# Patient Record
Sex: FEMALE | Race: OTHER | URBAN - METROPOLITAN AREA
[De-identification: names, ages, dates, MRNs, and addresses within clinical notes are randomized per-mention and may not be internally consistent; named-entity substitution may affect disease eponyms.]

---

## 2021-07-27 ENCOUNTER — HOSPITAL ENCOUNTER (EMERGENCY)
Age: 41
Discharge: HOME | End: 2021-07-27
Payer: MEDICARE

## 2021-07-27 VITALS
OXYGEN SATURATION: 98 % | TEMPERATURE: 97.2 F | HEART RATE: 65 BPM | DIASTOLIC BLOOD PRESSURE: 127 MMHG | BODY MASS INDEX: 38.32 KG/M2 | RESPIRATION RATE: 16 BRPM | HEIGHT: 65 IN | SYSTOLIC BLOOD PRESSURE: 216 MMHG | WEIGHT: 230 LBS

## 2021-07-27 LAB
ANION GAP SERPL CALCULATED.3IONS-SCNC: 10 MMOL/L (ref 7–16)
BASOPHILS ABSOLUTE: 0.08 E9/L (ref 0–0.2)
BASOPHILS RELATIVE PERCENT: 1 % (ref 0–2)
BUN BLDV-MCNC: 16 MG/DL (ref 6–20)
CALCIUM SERPL-MCNC: 9 MG/DL (ref 8.6–10.2)
CHLORIDE BLD-SCNC: 104 MMOL/L (ref 98–107)
CO2: 25 MMOL/L (ref 22–29)
CREAT SERPL-MCNC: 0.8 MG/DL (ref 0.5–1)
EKG ATRIAL RATE: 66 BPM
EKG P AXIS: 21 DEGREES
EKG P-R INTERVAL: 142 MS
EKG Q-T INTERVAL: 420 MS
EKG QRS DURATION: 86 MS
EKG QTC CALCULATION (BAZETT): 440 MS
EKG R AXIS: 55 DEGREES
EKG T AXIS: 48 DEGREES
EKG VENTRICULAR RATE: 66 BPM
EOSINOPHILS ABSOLUTE: 0.17 E9/L (ref 0.05–0.5)
EOSINOPHILS RELATIVE PERCENT: 2.2 % (ref 0–6)
GFR AFRICAN AMERICAN: >60
GFR NON-AFRICAN AMERICAN: >60 ML/MIN/1.73
GLUCOSE BLD-MCNC: 106 MG/DL (ref 74–99)
HCT VFR BLD CALC: 39.3 % (ref 34–48)
HEMOGLOBIN: 12.9 G/DL (ref 11.5–15.5)
IMMATURE GRANULOCYTES #: 0.02 E9/L
IMMATURE GRANULOCYTES %: 0.3 % (ref 0–5)
LYMPHOCYTES ABSOLUTE: 2.29 E9/L (ref 1.5–4)
LYMPHOCYTES RELATIVE PERCENT: 29.7 % (ref 20–42)
MCH RBC QN AUTO: 28.1 PG (ref 26–35)
MCHC RBC AUTO-ENTMCNC: 32.8 % (ref 32–34.5)
MCV RBC AUTO: 85.6 FL (ref 80–99.9)
MONOCYTES ABSOLUTE: 0.52 E9/L (ref 0.1–0.95)
MONOCYTES RELATIVE PERCENT: 6.8 % (ref 2–12)
NEUTROPHILS ABSOLUTE: 4.62 E9/L (ref 1.8–7.3)
NEUTROPHILS RELATIVE PERCENT: 60 % (ref 43–80)
PDW BLD-RTO: 12.8 FL (ref 11.5–15)
PLATELET # BLD: 400 E9/L (ref 130–450)
PMV BLD AUTO: 9.5 FL (ref 7–12)
POTASSIUM SERPL-SCNC: 3.8 MMOL/L (ref 3.5–5)
PRO-BNP: 47 PG/ML (ref 0–125)
RBC # BLD: 4.59 E12/L (ref 3.5–5.5)
SODIUM BLD-SCNC: 139 MMOL/L (ref 132–146)
TROPONIN, HIGH SENSITIVITY: <6 NG/L (ref 0–9)
WBC # BLD: 7.7 E9/L (ref 4.5–11.5)

## 2021-07-27 PROCEDURE — 93010 ELECTROCARDIOGRAM REPORT: CPT | Performed by: INTERNAL MEDICINE

## 2021-07-27 PROCEDURE — 85025 COMPLETE CBC W/AUTO DIFF WBC: CPT

## 2021-07-27 PROCEDURE — 93005 ELECTROCARDIOGRAM TRACING: CPT | Performed by: EMERGENCY MEDICINE

## 2021-07-27 PROCEDURE — 84484 ASSAY OF TROPONIN QUANT: CPT

## 2021-07-27 PROCEDURE — 4500000002 HC ER NO CHARGE

## 2021-07-27 PROCEDURE — 80048 BASIC METABOLIC PNL TOTAL CA: CPT

## 2021-07-27 PROCEDURE — 83880 ASSAY OF NATRIURETIC PEPTIDE: CPT

## 2021-07-27 ASSESSMENT — PAIN DESCRIPTION - LOCATION: LOCATION: HEAD

## 2021-07-27 ASSESSMENT — PAIN DESCRIPTION - ORIENTATION: ORIENTATION: RIGHT

## 2021-07-27 ASSESSMENT — PAIN DESCRIPTION - DESCRIPTORS: DESCRIPTORS: PRESSURE;HEADACHE

## 2021-07-27 ASSESSMENT — PAIN DESCRIPTION - PAIN TYPE: TYPE: ACUTE PAIN

## 2021-07-27 NOTE — ED NOTES
Bloodwork completed and sent to lab.   EKG completed and given to Dr Reina Moseley, RN  07/27/21 [id number]

## 2022-01-06 ENCOUNTER — OFFICE VISIT (OUTPATIENT)
Dept: FAMILY MEDICINE CLINIC | Age: 42
End: 2022-01-06
Payer: MEDICARE

## 2022-01-06 VITALS
BODY MASS INDEX: 36.49 KG/M2 | RESPIRATION RATE: 18 BRPM | TEMPERATURE: 98 F | SYSTOLIC BLOOD PRESSURE: 166 MMHG | WEIGHT: 219 LBS | OXYGEN SATURATION: 98 % | DIASTOLIC BLOOD PRESSURE: 104 MMHG | HEART RATE: 76 BPM | HEIGHT: 65 IN

## 2022-01-06 DIAGNOSIS — I10 PRIMARY HYPERTENSION: ICD-10-CM

## 2022-01-06 DIAGNOSIS — Z00.00 HEALTHCARE MAINTENANCE: ICD-10-CM

## 2022-01-06 DIAGNOSIS — J45.909 UNCOMPLICATED ASTHMA, UNSPECIFIED ASTHMA SEVERITY, UNSPECIFIED WHETHER PERSISTENT: ICD-10-CM

## 2022-01-06 DIAGNOSIS — Z12.39 SCREENING BREAST EXAMINATION: Primary | ICD-10-CM

## 2022-01-06 LAB
BACTERIA: ABNORMAL /HPF
BILIRUBIN URINE: NEGATIVE
BLOOD, URINE: ABNORMAL
CLARITY: CLEAR
COLOR: YELLOW
CRYSTALS, UA: ABNORMAL /HPF
EPITHELIAL CELLS, UA: ABNORMAL /HPF
GLUCOSE URINE: NEGATIVE MG/DL
HCT VFR BLD CALC: 41.6 % (ref 34–48)
HEMOGLOBIN: 13.7 G/DL (ref 11.5–15.5)
KETONES, URINE: NEGATIVE MG/DL
LEUKOCYTE ESTERASE, URINE: ABNORMAL
MCH RBC QN AUTO: 28.3 PG (ref 26–35)
MCHC RBC AUTO-ENTMCNC: 32.9 % (ref 32–34.5)
MCV RBC AUTO: 86 FL (ref 80–99.9)
NITRITE, URINE: NEGATIVE
PDW BLD-RTO: 13.9 FL (ref 11.5–15)
PH UA: 5.5 (ref 5–9)
PLATELET # BLD: 306 E9/L (ref 130–450)
PMV BLD AUTO: 10.2 FL (ref 7–12)
PROTEIN UA: NEGATIVE MG/DL
RBC # BLD: 4.84 E12/L (ref 3.5–5.5)
RBC UA: ABNORMAL /HPF (ref 0–2)
SPECIFIC GRAVITY UA: 1.02 (ref 1–1.03)
UROBILINOGEN, URINE: 0.2 E.U./DL
WBC # BLD: 4.6 E9/L (ref 4.5–11.5)
WBC UA: ABNORMAL /HPF (ref 0–5)

## 2022-01-06 PROCEDURE — 36415 COLL VENOUS BLD VENIPUNCTURE: CPT | Performed by: FAMILY MEDICINE

## 2022-01-06 PROCEDURE — G8417 CALC BMI ABV UP PARAM F/U: HCPCS

## 2022-01-06 PROCEDURE — 4004F PT TOBACCO SCREEN RCVD TLK: CPT

## 2022-01-06 PROCEDURE — 99203 OFFICE O/P NEW LOW 30 MIN: CPT

## 2022-01-06 PROCEDURE — G8484 FLU IMMUNIZE NO ADMIN: HCPCS

## 2022-01-06 PROCEDURE — G8427 DOCREV CUR MEDS BY ELIG CLIN: HCPCS

## 2022-01-06 RX ORDER — ALBUTEROL SULFATE 90 UG/1
2 AEROSOL, METERED RESPIRATORY (INHALATION) 4 TIMES DAILY PRN
Qty: 18 G | Refills: 0 | Status: SHIPPED
Start: 2022-01-06 | End: 2022-10-31 | Stop reason: SDUPTHER

## 2022-01-06 RX ORDER — AMLODIPINE BESYLATE 5 MG/1
5 TABLET ORAL DAILY
Qty: 30 TABLET | Refills: 3 | Status: SHIPPED
Start: 2022-01-06 | End: 2022-02-21 | Stop reason: SINTOL

## 2022-01-06 SDOH — ECONOMIC STABILITY: FOOD INSECURITY: WITHIN THE PAST 12 MONTHS, YOU WORRIED THAT YOUR FOOD WOULD RUN OUT BEFORE YOU GOT MONEY TO BUY MORE.: NEVER TRUE

## 2022-01-06 SDOH — ECONOMIC STABILITY: FOOD INSECURITY: WITHIN THE PAST 12 MONTHS, THE FOOD YOU BOUGHT JUST DIDN'T LAST AND YOU DIDN'T HAVE MONEY TO GET MORE.: NEVER TRUE

## 2022-01-06 ASSESSMENT — LIFESTYLE VARIABLES
HOW MANY STANDARD DRINKS CONTAINING ALCOHOL DO YOU HAVE ON A TYPICAL DAY: 3 OR 4
HOW OFTEN DO YOU HAVE A DRINK CONTAINING ALCOHOL: 4 OR MORE TIMES A WEEK

## 2022-01-06 ASSESSMENT — SOCIAL DETERMINANTS OF HEALTH (SDOH): HOW HARD IS IT FOR YOU TO PAY FOR THE VERY BASICS LIKE FOOD, HOUSING, MEDICAL CARE, AND HEATING?: NOT HARD AT ALL

## 2022-01-06 NOTE — PROGRESS NOTES
S: Scott Davis 42 y.o. female  here for establishment of care with new provider. Chief concerns today include hypertension and asthma; she needs medication refills. Hypertension n: Patient is noted to be hypertensive today. She has been out of medications and needs refills. She just moved to [facility] from [place], so there are no old records to review. Patient is unsure of what medication she was taking prior to running out. Reports headaches without the presence of any other hypertensive symptoms. She has been drinking alcohol over the last several days. Asthma: Status unknown. She had an albuterol inhaler that she used twice daily; does not know the name of any other additional medication she may be on. Symptoms include wheezing and shortness of breath. Social history: Tobacco use: 1 pack/day. Alcohol use: 1 bottle of wine per week. Denies drug use. No surgical history. FH: Unremarkable  Health maintenance: Patient inquired about breast cancer screening. Requests Covid testing in order to return to work. Denies symptoms and denies contacts. O: VS: BP (!) 166/104 (Site: Left Upper Arm, Position: Sitting, Cuff Size: Medium Adult)   Pulse 76   Temp 98 °F (36.7 °C) (Temporal)   Resp 18   Ht 5' 5\" (1.651 m)   Wt 219 lb (99.3 kg)   LMP 12/31/2021   SpO2 98%   BMI 36.44 kg/m²    General: NAD. She is obese. Neck: WDL   CV:  RRR, no gallops, rubs, or murmurs   Resp: CTAB; remarkable for end expiratory wheezes bilaterally. Abd:  Soft, nontender, no masses    Ext:  no C/C/E    Assessment / Plan:      Judy Manriquez was seen today for establish care, asthma and hypertension. Diagnoses and all orders for this visit:      Primary hypertension  Uncontrolled at the moment as she is not taking any medication. States she previously was on a pill but does not know name of medication. Will start trial of amlodipine 5mg once a day and obtain BMP to asses for renal function.  Also TSH,CBC, lipid, UA. Will follow up with her in 3 weeks for BP check  -Norvasc 5mg once a day  - URINALYSIS; Future  - LIPID PANEL; Future  - BASIC METABOLIC PANEL; Future  - CBC; Future  - TSH; Future  - T4, FREE; Future      Healthcare maintenance  Needs mammography and following labs. - COVID-19 Ambulatory; Future  - URINALYSIS; Future  - LIPID PANEL; Future  - BASIC METABOLIC PANEL; Future  - CBC; Future  - TSH; Future  - T4, FREE; Future  - JOSE SHAKILA DIGITAL SCREEN BILATERAL; Future        Uncomplicated asthma, unspecified asthma severity, unspecified whether persistent  History of asthma previosuly treated with albuterol. Unknown if she has been on any other medication in the past. Will prescribe rescue inhaler and instruct patient to keep a log on how many times she in using in a day. Will follow up on next visit to assess if patient needs to be started on ICS. Consider PFTs. - Abluterol inhaler.                 Return to Office: Return in about 3 weeks (around 1/27/2022). Assessment/Plan:  1. Encounter to establish with new provider  2. Hypertension: Uncontrolled because she has been out of medication; medication is unknown. Trial of amlodipine 5 mg a day initiated. Obtain BMP to get baseline renal functions. Other labs include UA, CBC, TSH, lipid panel. Lifestyle modifications discussed. 3.  Asthma: Uncontrolled because she is out of medication. Will prescribe albuterol rescue inhaler at this time. Patient is counseled to keep track of how often she is using this inhaler. Smoking cessation discussed. 4.  Health maintenance: Mammography ordered. Covid testing ordered. Return in about 3 weeks (around 1/27/2022). Follow-up up in 2 weeks to assess blood pressure with the start of medication and to assess response of asthma symptoms to use of rescue inhaler. If rescue inhaler is being used frequently, need to consider PFTs and starting an ICS.     Attending Physician Statement  I have discussed the case, including pertinent history and exam findings with the resident. I agree with the documented assessment and plan.          Blu Shoemaker MD

## 2022-01-06 NOTE — PROGRESS NOTES
img [id number]  [address] RESIDENCY PROGRAM  DATE OF VISIT : 1/15/2022    Patient : Grey Fernandez   Age : 42 y.o.  : 1980   MRN : <[id number]>   ______________________________________________________________________    Chief Complaint:   Chief Complaint   Patient presents with   [address]     no meds for 6 months    Asthma    Hypertension     requesting covid test       HPI:   History obtained from the patient. Grey Fernandez is a 42 y.o. female who comes to clinic to establish care. Past medical history of asthma and Hypertension. Recently moved to [facility] from [facility], needs medication refills and to establish new pcp. Asthma treated with albuterol inhaler, uses it at least once daily. Not on any other medication. States she presents occasionally shortness of breath or wheezing that is relieved with albuterol  Hypertension was being treated previously with one pill once a day but she does not know the name. Has not been able to take BP at home and does not follow any diet. States that she has had headache for last couple of days but has been drinking heavily due to festivities. No headaches apart from these last days. Denies any nausea, vomiting, palpitation or chest pain    Also wants mammography done, states she feels lump that comes and goes every couple of days, is painless and does not present any discharge. Does not correlates it to menstrual periods. Lump has been present for around a month on left breast.  LMP: 2022   Also wants Covid test done. States she is asymptomatic but needs it to go back to work in [employer]. Denies any recent sick contacts or exposure. Alcohol : 1 bottle wine per week  Smokes 1 pack per day    Sexual activity: 1 partner. Denies having sons or pregnancy      Past Medical History:  No past medical history on file. Past Surgical History:  No past surgical history on file.     Family History:  No family history on file.    Social History:  Social History     Socioeconomic History    Marital status: Legally      Spouse name: None    Number of children: None    Years of education: None    Highest education level: None   Occupational History    None   Tobacco Use    Smoking status: Current Every Day Smoker     Packs/day: 1.00     Types: Cigarettes     Start date: 1/1/1994    Smokeless tobacco: Never Used   Substance and Sexual Activity    Alcohol use: Yes     Comment: socially    Drug use: Yes     Types: Marijuana Lyndel Eusebia)    Sexual activity: None   Other Topics Concern    None   Social History Narrative    None     Social Determinants of Health     Financial Resource Strain: Low Risk     Difficulty of Paying Living Expenses: Not hard at all   Food Insecurity: No Food Insecurity    Worried About Running Out of Food in the Last Year: Never true    Dylon of Food in the Last Year: Never true   Transportation Needs:     Lack of Transportation (Medical): Not on file    Lack of Transportation (Non-Medical):  Not on file   Physical Activity:     Days of Exercise per Week: Not on file    Minutes of Exercise per Session: Not on file   Stress:     Feeling of Stress : Not on file   Social Connections:     Frequency of Communication with Friends and Family: Not on file    Frequency of Social Gatherings with Friends and Family: Not on file    Attends Zoroastrianism Services: Not on file    Active Member of [address] [place] [address] or Organizations: Not on file    Attends Club or Organization Meetings: Not on file    Marital Status: Not on file   Intimate Partner Violence:     Fear of Current or Ex-Partner: Not on file    Emotionally Abused: Not on file    Physically Abused: Not on file    Sexually Abused: Not on file   Housing Stability:     Unable to Pay for Housing in the Last Year: Not on file    Number of [place] in the Last Year: Not on file    Unstable Housing in the Last Year: Not on file       Allergies:   No Known Allergies    Medications:    Current Outpatient Medications   Medication Sig Dispense Refill    amLODIPine (NORVASC) 5 MG tablet Take 1 tablet by mouth daily 30 tablet 3    albuterol sulfate HFA (VENTOLIN HFA) 108 (90 Base) MCG/ACT inhaler Inhale 2 puffs into the lungs 4 times daily as needed for Wheezing 18 g 0     No current facility-administered medications for this visit. Review of Systems:  Review of Systems   Constitutional: Negative for chills, diaphoresis, fatigue and fever. HENT: Negative for nosebleeds, postnasal drip, rhinorrhea, sinus pain and tinnitus. Eyes: Negative for photophobia, pain and visual disturbance. Respiratory: Positive for shortness of breath and wheezing. Negative for cough, choking, chest tightness and stridor. Cardiovascular: Negative for chest pain, palpitations and leg swelling. Gastrointestinal: Negative for constipation, diarrhea, nausea and vomiting. Endocrine: Negative for polydipsia, polyphagia and polyuria. Genitourinary: Negative for dyspareunia, dysuria, enuresis and hematuria. Musculoskeletal: Negative for arthralgias, back pain, gait problem and myalgias. Skin: Negative for pallor, rash and wound. Allergic/Immunologic: Negative for immunocompromised state. Neurological: Positive for headaches. Negative for tremors, seizures, facial asymmetry, weakness and light-headedness. Hematological: Negative for adenopathy. Does not bruise/bleed easily.    Psychiatric/Behavioral: Negative for confusion, decreased concentration, dysphoric mood, hallucinations and suicidal ideas.     ______________________________________________________________________    Physical Exam:    Vitals: BP (!) 166/104 (Site: Left Upper Arm, Position: Sitting, Cuff Size: Medium Adult)   Pulse 76   Temp 98 °F (36.7 °C) (Temporal)   Resp 18   Ht 5' 5\" (1.651 m)   Wt 219 lb (99.3 kg)   LMP 12/31/2021   SpO2 98%   BMI 36.44 kg/m²   Physical Exam  Constitutional: General: She is not in acute distress. Appearance: Normal appearance. She is not toxic-appearing. HENT:      Head: Normocephalic and atraumatic. Right Ear: Ear canal normal.      Left Ear: Ear canal normal.      Nose: Nose normal. No congestion. Mouth/Throat:      Mouth: Mucous membranes are moist.      Pharynx: Oropharynx is clear. No oropharyngeal exudate. Eyes:      Pupils: Pupils are equal, round, and reactive to light. Cardiovascular:      Rate and Rhythm: Normal rate and regular rhythm. Pulses: Normal pulses. Heart sounds: Normal heart sounds. No murmur heard. No gallop. Pulmonary:      Effort: Pulmonary effort is normal.      Breath sounds: No stridor. Wheezing present. No rales. Abdominal:      General: Abdomen is flat. Bowel sounds are normal.      Palpations: There is no mass. Tenderness: There is no abdominal tenderness. Hernia: No hernia is present. Musculoskeletal:         General: No swelling or tenderness. Normal range of motion. Cervical back: Normal range of motion and neck supple. Right lower leg: No edema. Left lower leg: No edema. Skin:     General: Skin is warm and dry. Capillary Refill: Capillary refill takes less than 2 seconds. Findings: No bruising or lesion. Neurological:      General: No focal deficit present. Mental Status: She is alert and oriented to person, place, and time. Motor: No weakness. Gait: Gait normal.   Psychiatric:         Mood and Affect: Mood normal.         Behavior: Behavior normal.       ______________________________________________________________________    Assessment & Plan:    Primary hypertension  Uncontrolled at the moment as she is not taking any medication. States she previously was on a pill but does not know name of medication. Will start trial of amlodipine 5mg once a day and obtain BMP to asses for renal function. Also TSH,CBC, lipid, UA.  Will follow up with her in 3 weeks for BP check  -Norvasc 5mg once a day  - URINALYSIS; Future  - LIPID PANEL; Future  - BASIC METABOLIC PANEL; Future  - CBC; Future  - TSH; Future  - T4, FREE; Future     Healthcare maintenance  Needs mammography and following labs. - COVID-19 Ambulatory; Future  - URINALYSIS; Future  - LIPID PANEL; Future  - BASIC METABOLIC PANEL; Future  - CBC; Future  - TSH; Future  - T4, FREE; Future  - JOSE SHAKILA DIGITAL SCREEN BILATERAL; Future      Uncomplicated asthma, unspecified asthma severity, unspecified whether persistent  History of asthma previosuly treated with albuterol. Unknown if she has been on any other medication in the past. Will prescribe rescue inhaler and instruct patient to keep a log on how many times she in using in a day. Will follow up on next visit to assess if patient needs to be started on ICS. Consider PFTs. - Abluterol inhaler. Return to Office: Return in about 3 weeks (around 1/27/2022).     Devang Shipley MD   This case was discussed with Dr. Cadence Moore

## 2022-01-07 LAB
ANION GAP SERPL CALCULATED.3IONS-SCNC: 11 MMOL/L (ref 7–16)
BUN BLDV-MCNC: 14 MG/DL (ref 6–20)
CALCIUM SERPL-MCNC: 9.2 MG/DL (ref 8.6–10.2)
CHLORIDE BLD-SCNC: 103 MMOL/L (ref 98–107)
CHOLESTEROL, TOTAL: 194 MG/DL (ref 0–199)
CO2: 24 MMOL/L (ref 22–29)
CREAT SERPL-MCNC: 0.8 MG/DL (ref 0.5–1)
GFR AFRICAN AMERICAN: >60
GFR NON-AFRICAN AMERICAN: >60 ML/MIN/1.73
GLUCOSE BLD-MCNC: 90 MG/DL (ref 74–99)
HDLC SERPL-MCNC: 49 MG/DL
LDL CHOLESTEROL CALCULATED: 126 MG/DL (ref 0–99)
POTASSIUM SERPL-SCNC: 3.8 MMOL/L (ref 3.5–5)
SODIUM BLD-SCNC: 138 MMOL/L (ref 132–146)
T4 FREE: 1.48 NG/DL (ref 0.93–1.7)
TRIGL SERPL-MCNC: 95 MG/DL (ref 0–149)
TSH SERPL DL<=0.05 MIU/L-ACNC: 1.01 UIU/ML (ref 0.27–4.2)
VLDLC SERPL CALC-MCNC: 19 MG/DL

## 2022-01-08 LAB
SARS-COV-2: DETECTED
SOURCE: ABNORMAL

## 2022-01-14 ENCOUNTER — TELEPHONE (OUTPATIENT)
Dept: FAMILY MEDICINE CLINIC | Age: 42
End: 2022-01-14

## 2022-01-15 ENCOUNTER — TELEPHONE (OUTPATIENT)
Dept: FAMILY MEDICINE CLINIC | Age: 42
End: 2022-01-15

## 2022-01-15 RX ORDER — LISINOPRIL 10 MG/1
10 TABLET ORAL DAILY
Qty: 30 TABLET | Refills: 0 | Status: SHIPPED
Start: 2022-01-15 | End: 2022-02-09

## 2022-01-15 ASSESSMENT — ENCOUNTER SYMPTOMS
VOMITING: 0
CHOKING: 0
BACK PAIN: 0
SHORTNESS OF BREATH: 1
RHINORRHEA: 0
COUGH: 0
WHEEZING: 1
EYE PAIN: 0
DIARRHEA: 0
NAUSEA: 0
PHOTOPHOBIA: 0
SINUS PAIN: 0
CHEST TIGHTNESS: 0
STRIDOR: 0
CONSTIPATION: 0

## 2022-01-15 NOTE — TELEPHONE ENCOUNTER
Called patient to inform about lab and covid results. Patient told me that she was experiencing itchiness and rashes on arms and face after taking Norvasc 5mg as prescribed. Told patient to stop taking norvasc and start taking Lisinopril 10mgs once a day. Medication sent to pharmacy.      Maddie Singh MD

## 2022-01-21 ENCOUNTER — HOSPITAL ENCOUNTER (OUTPATIENT)
Dept: GENERAL RADIOLOGY | Age: 42
Discharge: HOME | End: 2022-01-23
Payer: MEDICARE

## 2022-01-21 DIAGNOSIS — Z00.00 HEALTHCARE MAINTENANCE: ICD-10-CM

## 2022-01-21 DIAGNOSIS — Z12.39 SCREENING BREAST EXAMINATION: ICD-10-CM

## 2022-01-21 PROCEDURE — 77063 BREAST TOMOSYNTHESIS BI: CPT

## 2022-01-26 DIAGNOSIS — R92.8 ABNORMAL SCREENING MAMMOGRAM: Primary | ICD-10-CM

## 2022-02-01 ENCOUNTER — TELEPHONE (OUTPATIENT)
Dept: GENERAL RADIOLOGY | Age: 42
End: 2022-02-01

## 2022-02-02 ENCOUNTER — HOSPITAL ENCOUNTER (OUTPATIENT)
Dept: GENERAL RADIOLOGY | Age: 42
Discharge: HOME | End: 2022-02-04
Payer: MEDICARE

## 2022-02-02 DIAGNOSIS — R92.8 ABNORMAL SCREENING MAMMOGRAM: ICD-10-CM

## 2022-02-02 PROCEDURE — 76642 ULTRASOUND BREAST LIMITED: CPT

## 2022-02-02 PROCEDURE — G0279 TOMOSYNTHESIS, MAMMO: HCPCS

## 2022-02-09 RX ORDER — LISINOPRIL 10 MG/1
TABLET ORAL
Qty: 30 TABLET | Refills: 0 | Status: SHIPPED
Start: 2022-02-09 | End: 2022-02-21 | Stop reason: SDUPTHER

## 2022-02-21 ENCOUNTER — OFFICE VISIT (OUTPATIENT)
Dept: FAMILY MEDICINE CLINIC | Age: 42
End: 2022-02-21
Payer: MEDICARE

## 2022-02-21 VITALS
DIASTOLIC BLOOD PRESSURE: 99 MMHG | WEIGHT: 216.8 LBS | BODY MASS INDEX: 36.12 KG/M2 | HEART RATE: 69 BPM | RESPIRATION RATE: 18 BRPM | SYSTOLIC BLOOD PRESSURE: 153 MMHG | HEIGHT: 65 IN | TEMPERATURE: 99.9 F | OXYGEN SATURATION: 98 %

## 2022-02-21 DIAGNOSIS — Z00.00 HEALTHCARE MAINTENANCE: ICD-10-CM

## 2022-02-21 DIAGNOSIS — I10 PRIMARY HYPERTENSION: ICD-10-CM

## 2022-02-21 DIAGNOSIS — J45.20 MILD INTERMITTENT ASTHMA WITHOUT COMPLICATION: ICD-10-CM

## 2022-02-21 DIAGNOSIS — J30.81 ALLERGIC RHINITIS DUE TO ANIMAL HAIR AND DANDER: Primary | ICD-10-CM

## 2022-02-21 DIAGNOSIS — Z78.9 LDL-C GREATER THAN OR EQUAL TO 100 MG/DL: ICD-10-CM

## 2022-02-21 PROCEDURE — 99213 OFFICE O/P EST LOW 20 MIN: CPT

## 2022-02-21 PROCEDURE — G8484 FLU IMMUNIZE NO ADMIN: HCPCS

## 2022-02-21 PROCEDURE — 4004F PT TOBACCO SCREEN RCVD TLK: CPT

## 2022-02-21 PROCEDURE — G8427 DOCREV CUR MEDS BY ELIG CLIN: HCPCS

## 2022-02-21 PROCEDURE — G8417 CALC BMI ABV UP PARAM F/U: HCPCS

## 2022-02-21 RX ORDER — LISINOPRIL 20 MG/1
20 TABLET ORAL DAILY
Qty: 30 TABLET | Refills: 2 | Status: SHIPPED
Start: 2022-02-21 | End: 2022-06-07 | Stop reason: ALTCHOICE

## 2022-02-21 RX ORDER — CETIRIZINE HYDROCHLORIDE 10 MG/1
10 TABLET ORAL DAILY
Qty: 90 TABLET | Refills: 1 | Status: SHIPPED
Start: 2022-02-21 | End: 2022-10-31 | Stop reason: SDUPTHER

## 2022-02-21 ASSESSMENT — ENCOUNTER SYMPTOMS
ABDOMINAL DISTENTION: 0
STRIDOR: 0
COUGH: 0
FACIAL SWELLING: 0
PHOTOPHOBIA: 0
ABDOMINAL PAIN: 0
NAUSEA: 0
CONSTIPATION: 0
BACK PAIN: 0
EYE REDNESS: 1
EYE DISCHARGE: 1
VOMITING: 0
SHORTNESS OF BREATH: 0
DIARRHEA: 0
RHINORRHEA: 1
EYE ITCHING: 1
SINUS PRESSURE: 0
WHEEZING: 0
SORE THROAT: 0

## 2022-02-21 ASSESSMENT — PATIENT HEALTH QUESTIONNAIRE - PHQ9
5. POOR APPETITE OR OVEREATING: 1
10. IF YOU CHECKED OFF ANY PROBLEMS, HOW DIFFICULT HAVE THESE PROBLEMS MADE IT FOR YOU TO DO YOUR WORK, TAKE CARE OF THINGS AT HOME, OR GET ALONG WITH OTHER PEOPLE: 3
1. LITTLE INTEREST OR PLEASURE IN DOING THINGS: 1
SUM OF ALL RESPONSES TO PHQ QUESTIONS 1-9: 9
7. TROUBLE CONCENTRATING ON THINGS, SUCH AS READING THE NEWSPAPER OR WATCHING TELEVISION: 1
9. THOUGHTS THAT YOU WOULD BE BETTER OFF DEAD, OR OF HURTING YOURSELF: 0
2. FEELING DOWN, DEPRESSED OR HOPELESS: 3
SUM OF ALL RESPONSES TO PHQ QUESTIONS 1-9: 9
8. MOVING OR SPEAKING SO SLOWLY THAT OTHER PEOPLE COULD HAVE NOTICED. OR THE OPPOSITE, BEING SO FIGETY OR RESTLESS THAT YOU HAVE BEEN MOVING AROUND A LOT MORE THAN USUAL: 0
6. FEELING BAD ABOUT YOURSELF - OR THAT YOU ARE A FAILURE OR HAVE LET YOURSELF OR YOUR FAMILY DOWN: 1
4. FEELING TIRED OR HAVING LITTLE ENERGY: 1
SUM OF ALL RESPONSES TO PHQ QUESTIONS 1-9: 9
SUM OF ALL RESPONSES TO PHQ QUESTIONS 1-9: 9
3. TROUBLE FALLING OR STAYING ASLEEP: 1
SUM OF ALL RESPONSES TO PHQ9 QUESTIONS 1 & 2: 4

## 2022-02-21 NOTE — PROGRESS NOTES
[facility]   FAMILY MEDICINE RESIDENCY PROGRAM  DATE OF VISIT : 2022    Patient : Mau Ayala   Age : 42 y.o.  : 1980   MRN : [id number]   ______________________________________________________________________    Chief Complaint:   Chief Complaint   Patient presents with    Abnormal Mammogram     follow up appt; cyst       HPI:   History obtained from the patient. Mau Ayala is a 42 y.o. female who comes to clinic for  follow up on chronic medical condition. BP has been better controlled since she started taking Lisinopril 10mg once a day. Today BP is 153/99. Patient denies any headache, chest pain, palpitations, shortness of breath. Asthma has been controlled only with rescue inhaler, and patient has only been using the inhaler around 2-3 times per week with 0 episodes of waking in the middle of night with shortness of breath. Today, She states that she has been presenting allergies with Rhinorrhea, nasal congestion, pruritus of the nose, eyes, and throat only when she is at home and around her 3 dogs. Especially when she has not cleaned up or groomed her dogs in some time and there is plenty of pet hair around the house. She usually takes benadryl and her symptoms go away. No symptoms outside the house or when she is not close to her pets. No other symptoms or concerns. Past Medical History:  No past medical history on file. Past Surgical History:  No past surgical history on file.     Family History:  Family History   Adopted: Yes       Social History:  Social History     Socioeconomic History    Marital status: Legally      Spouse name: None    Number of children: None    Years of education: None    Highest education level: None   Occupational History    None   Tobacco Use    Smoking status: Current Every Day Smoker     Packs/day: 1.00     Types: Cigarettes     Start date: 1994    Smokeless tobacco: Never Used   Substance and Sexual Activity  Alcohol use: Yes     Comment: socially    Drug use: Yes     Types: Marijuana Deboraha Sanchris)    Sexual activity: None   Other Topics Concern    None   Social History Narrative    None     Social Determinants of Health     Financial Resource Strain: Low Risk     Difficulty of Paying Living Expenses: Not hard at all   Food Insecurity: No Food Insecurity    Worried About Running Out of Food in the Last Year: Never true    Dylon of Food in the Last Year: Never true   Transportation Needs:     Lack of Transportation (Medical): Not on file    Lack of Transportation (Non-Medical): Not on file   Physical Activity:     Days of Exercise per Week: Not on file    Minutes of Exercise per Session: Not on file   Stress:     Feeling of Stress : Not on file   Social Connections:     Frequency of Communication with Friends and Family: Not on file    Frequency of Social Gatherings with Friends and Family: Not on file    Attends Scientologist Services: Not on file    Active Member of [address] [place] [address] or Organizations: Not on file    Attends Club or Organization Meetings: Not on file    Marital Status: Not on file   Intimate Partner Violence:     Fear of Current or Ex-Partner: Not on file    Emotionally Abused: Not on file    Physically Abused: Not on file    Sexually Abused: Not on file   Housing Stability:     Unable to Pay for Housing in the Last Year: Not on file    Number of [place] in the Last Year: Not on file    Unstable Housing in the Last Year: Not on file       Allergies:   No Known Allergies    Medications:    Current Outpatient Medications   Medication Sig Dispense Refill    lisinopril (PRINIVIL;ZESTRIL) 10 MG tablet TAKE 1 TABLET BY MOUTH EVERY DAY 30 tablet 0    albuterol sulfate HFA (VENTOLIN HFA) 108 (90 Base) MCG/ACT inhaler Inhale 2 puffs into the lungs 4 times daily as needed for Wheezing 18 g 0     No current facility-administered medications for this visit.         Review of Systems:  Review of Systems Constitutional: Negative for activity change, appetite change, chills and fatigue. HENT: Positive for congestion, postnasal drip, rhinorrhea and sneezing. Negative for drooling, ear pain, facial swelling, sinus pressure, sore throat and tinnitus. Eyes: Positive for discharge, redness and itching. Negative for photophobia and visual disturbance. Respiratory: Negative for cough, shortness of breath, wheezing and stridor. Gastrointestinal: Negative for abdominal distention, abdominal pain, constipation, diarrhea, nausea and vomiting. Endocrine: Negative for polydipsia, polyphagia and polyuria. Genitourinary: Negative for hematuria. Musculoskeletal: Negative for arthralgias, back pain and myalgias. Skin: Negative for rash and wound. Neurological: Negative for seizures, syncope, speech difficulty, weakness and numbness. Hematological: Negative for adenopathy. Psychiatric/Behavioral: Negative for self-injury, sleep disturbance and suicidal ideas. The patient is not nervous/anxious. ______________________________________________________________________    Physical Exam:    Vitals: BP (!) 153/99 (Site: Left Upper Arm, Position: Sitting, Cuff Size: Medium Adult)   Pulse 69   Temp 99.9 °F (37.7 °C) (Temporal)   Resp 18   Ht 5' 5\" (1.651 m)   Wt 216 lb 12.8 oz (98.3 kg)   LMP 02/06/2022   SpO2 98%   BMI 36.08 kg/m²   Physical Exam  Constitutional:       General: She is not in acute distress. Appearance: She is obese. She is not ill-appearing or toxic-appearing. HENT:      Head: Normocephalic and atraumatic. Right Ear: Tympanic membrane and external ear normal.      Left Ear: Tympanic membrane and external ear normal.      Nose: Nose normal. No congestion or rhinorrhea. Mouth/Throat:      Mouth: Mucous membranes are moist.      Pharynx: Oropharynx is clear. No oropharyngeal exudate or posterior oropharyngeal erythema. Eyes:      General:         Right eye: No discharge. Left eye: No discharge. Conjunctiva/sclera: Conjunctivae normal.      Pupils: Pupils are equal, round, and reactive to light. Cardiovascular:      Rate and Rhythm: Normal rate and regular rhythm. Heart sounds: Normal heart sounds. No murmur heard. No friction rub. No gallop. Pulmonary:      Effort: Pulmonary effort is normal.      Breath sounds: Normal breath sounds. No wheezing, rhonchi or rales. Abdominal:      General: Bowel sounds are normal. There is no distension. Palpations: Abdomen is soft. There is no mass. Tenderness: There is no abdominal tenderness. Musculoskeletal:         General: No swelling or tenderness. Normal range of motion. Cervical back: Normal range of motion and neck supple. Right lower leg: No edema. Left lower leg: No edema. Skin:     General: Skin is warm. Capillary Refill: Capillary refill takes less than 2 seconds. Findings: No bruising, lesion or rash. Neurological:      General: No focal deficit present. Psychiatric:         Mood and Affect: Mood normal.         Behavior: Behavior normal.       ______________________________________________________________________    Assessment & Plan:  There are no diagnoses linked to this encounter. Hypertension   Increase lisinopril to 20mg once a day. Patient was counseled on the importance of losing weight and exercising at least 3 times for 30 minutes/week. Patient denies having headaches, blurry vision, chest pain, palpitation. Will follow up in 1 month for BP check or sooner if needed. Asthma  Stable with rescue inhaler. Has 0 episodes of waking up during the nights needing the inhaler and uses the inhaler around 2-3 times per week. If asthma is not controlled in the future, consider Singulair for treatment of asthma/rhinitis      Allergic rhinitis due to animal hair and dander  Patient was counseled on avoiding allergenic's for her such as dog hair.   Patient states that she does not want to and will not get rid of her dog's dog that she will try to clean up the house and groomed the pads more frequently. She was also instructed to take Zyrtec 10 mg only when needed. Patient will try this measures and will report back or return to clinic if she does not improve with current plan/medication  - Zyrtec 10mgs prn allergies      Elevated LDL  1/26/2022 - Calculated LDL at 126. The 10-year ASCVD risk score (Tiffany Alexis, et al., 2013) is: 5.6 %    Values used to calculate the score:      Age: 42 years      Sex: Female      Is Non- : No      Diabetic: No      Tobacco smoker: Yes      Systolic Blood Pressure: 189 mmHg      Is BP treated: yes      HDL Cholesterol: 49 mg/dL      Total Cholesterol: 194 mg/dL  Borderline risk (5% to 7.4%)  - Counseled about healthy life style changes. Close monitoring. Repeat lipid panel in 3 months and consider starting statins if indicated. Health maintenance  Pneumococcal vaccine. No prior Pneumovax   Give Prevnar 13 at least 8 weeks before Pneumovax 23- due after 4/21/2022  Hep c screening  HIV screening    Obesity  Counseled on lifestyle modifications      Return to Office: No follow-ups on file.     Davidson Cedeno MD   This case was discussed with Dr. Leonela Miller

## 2022-02-21 NOTE — PROGRESS NOTES
S: 42 y.o. female here for HTN, asthma. Lisinopril. Not controlled. cv asx  Allergies. Rhinorrhea, eye watering. Only when around her dogs at home. Benadryl prn helps. Asthma. Albuterol 3-4 x per week. O: VS: BP (!) 153/99 (Site: Left Upper Arm, Position: Sitting, Cuff Size: Medium Adult)   Pulse 69   Temp 99.9 °F (37.7 °C) (Temporal)   Resp 18   Ht 5' 5\" (1.651 m)   Wt 216 lb 12.8 oz (98.3 kg)   LMP 02/06/2022   SpO2 98%   BMI 36.08 kg/m²    General: NAD, alert and interacting appropriately. No conjunctival injection/rhinorrhea noted   CV:  RRR, no gallops, rubs, or murmurs    Resp: CTAB   Abd:  Soft, nontender   Ext:  No edema    Impression: HTN. Allergies. Asthma. Plan:   Increase lisinopril to 20  Zyrtec prn and increase pet hygiene   CPM albuterol    d/w pt, labs. Attending Physician Statement  I have discussed the case, including pertinent history and exam findings with the resident. I agree with the documented assessment and plan.

## 2022-02-22 LAB
HEPATITIS C ANTIBODY INTERPRETATION: NORMAL
HIV-1 AND HIV-2 ANTIBODIES: NORMAL

## 2022-03-14 RX ORDER — LISINOPRIL 10 MG/1
TABLET ORAL
Qty: 30 TABLET | Refills: 0 | OUTPATIENT
Start: 2022-03-14

## 2022-03-22 ENCOUNTER — TELEPHONE (OUTPATIENT)
Dept: FAMILY MEDICINE CLINIC | Age: 42
End: 2022-03-22

## 2022-03-22 NOTE — TELEPHONE ENCOUNTER
Hello,  Please let Allegra Gracia know that her Hepatitis C and HIV laboratory results came back negative. Thank you.

## 2022-03-22 NOTE — TELEPHONE ENCOUNTER
Roberto Casas called in and is asking about her labs from February. Can you please review so that we may call the patient with her results.     Thank you

## 2022-04-26 ENCOUNTER — OFFICE VISIT (OUTPATIENT)
Dept: FAMILY MEDICINE CLINIC | Age: 42
End: 2022-04-26
Payer: MEDICARE

## 2022-04-26 ENCOUNTER — NURSE TRIAGE (OUTPATIENT)
Dept: OTHER | Facility: CLINIC | Age: 42
End: 2022-04-26

## 2022-04-26 VITALS
RESPIRATION RATE: 18 BRPM | HEART RATE: 66 BPM | DIASTOLIC BLOOD PRESSURE: 103 MMHG | OXYGEN SATURATION: 99 % | WEIGHT: 221 LBS | BODY MASS INDEX: 36.82 KG/M2 | SYSTOLIC BLOOD PRESSURE: 183 MMHG | HEIGHT: 65 IN | TEMPERATURE: 98.6 F

## 2022-04-26 DIAGNOSIS — R00.2 PALPITATIONS: Primary | ICD-10-CM

## 2022-04-26 DIAGNOSIS — I10 PRIMARY HYPERTENSION: ICD-10-CM

## 2022-04-26 PROCEDURE — 99213 OFFICE O/P EST LOW 20 MIN: CPT | Performed by: FAMILY MEDICINE

## 2022-04-26 PROCEDURE — 93005 ELECTROCARDIOGRAM TRACING: CPT | Performed by: FAMILY MEDICINE

## 2022-04-26 PROCEDURE — 4004F PT TOBACCO SCREEN RCVD TLK: CPT | Performed by: FAMILY MEDICINE

## 2022-04-26 PROCEDURE — G8427 DOCREV CUR MEDS BY ELIG CLIN: HCPCS | Performed by: FAMILY MEDICINE

## 2022-04-26 PROCEDURE — G8417 CALC BMI ABV UP PARAM F/U: HCPCS | Performed by: FAMILY MEDICINE

## 2022-04-26 PROCEDURE — 93010 ELECTROCARDIOGRAM REPORT: CPT | Performed by: FAMILY MEDICINE

## 2022-04-26 PROCEDURE — 99214 OFFICE O/P EST MOD 30 MIN: CPT | Performed by: FAMILY MEDICINE

## 2022-04-26 RX ORDER — EPINEPHRINE 0.3 MG/.3ML
INJECTION SUBCUTANEOUS
Qty: 1 EACH | Refills: 2 | Status: SHIPPED | OUTPATIENT
Start: 2022-04-26

## 2022-04-26 RX ORDER — BLOOD PRESSURE TEST KIT
1 KIT MISCELLANEOUS 2 TIMES DAILY
Qty: 1 KIT | Refills: 0 | Status: SHIPPED | OUTPATIENT
Start: 2022-04-26

## 2022-04-26 RX ORDER — AMLODIPINE BESYLATE 10 MG/1
10 TABLET ORAL DAILY
Qty: 30 TABLET | Refills: 3 | Status: SHIPPED
Start: 2022-04-26 | End: 2022-06-07 | Stop reason: ALTCHOICE

## 2022-04-26 NOTE — TELEPHONE ENCOUNTER
Received call from Tereza Bass at [facility] with [employer]. Subjective: Caller states \"Palpitations\"    Current Symptoms: has been having off/on movements of heart racing and pounding. Says her BP meds have been adjusted recently. Had a mild heart attacks 6 years ago. Has shortness of breath with chronic asthma, no recent changes. Denies chest pain, gets lightheaded off/on when heart feels like it's pounding or racing. When has these episodes they last a few sounds up to a minute. Onset: 3 days ago; intermittent    Associated Symptoms: NA    Pain Severity: 0/10; N/A; none    Temperature: no fever  by unknown method    What has been tried: nothng    LMP: recent Pregnant: No    Recommended disposition: See in Office Today    Care advice provided, patient verbalizes understanding; denies any other questions or concerns; instructed to call back for any new or worsening symptoms. Patient/Caller agrees with recommended disposition; writer [facility] warm [facility] for appointment scheduling     Attention Provider: Thank you for allowing me to participate in the care of your patient. The patient was connected to triage in response to information provided to the ECC/PSC. Please do not respond through this encounter as the response is not directed to a shared pool.             Reason for Disposition   Patient wants to be seen    Protocols used: HEART RATE AND HEARTBEAT QUESTIONS-ADULT-[place]

## 2022-04-26 NOTE — PROGRESS NOTES
[address] [place] [address]  FAMILY MEDICINE RESIDENCY PROGRAM  DATE OF VISIT : 2022    Patient : Kita Garcia   Age : 42 y.o.  : 1980   MRN : [id number]   ______________________________________________________________________    Chief Complaint :   Chief Complaint   Patient presents with    Palpitations    Other     Face and Ears turn red during the palpitations       HPI : Kita Garcia is 42 y.o. female who presented to the clinic today for above chief complaint. Reports ongoing palpitations over a long period of time. Was diagnosed with HTN over 6 years ago, recently restarted on lisinopril 20. Over last few days reports palpiations and racing heart that occur suddenly. Associated with lightheadedness, chest pressure, shortness of breath, perspiration. Last about 1-2 minutes. Multiple episodes yesterday. None today. Has episodes few episodes in the past, though not as frequent or severe as yesterday. Does have history of asthma. No hx MI, CHF. Does not reports anxiety or stress or worry. Did not take lisinopril this morning for fear that it was contributing symptoms. No difficulty with speaking or swallowing, no oral swelling. Past Medical History :  Past Medical History:   Diagnosis Date    Allergic rhinitis due to animal hair and dander 2022    Hypertension 2022    LDL-c greater than or equal to 100 mg/dl 2022    Mild intermittent asthma without complication      No past surgical history on file.       Review of Systems :    ROS - Per HPI   ______________________________________________________________________    Physical Exam :    Wt Readings from Last 3 Encounters:   22 221 lb (100.2 kg)   22 216 lb 12.8 oz (98.3 kg)   22 219 lb (99.3 kg)       BMI Readings from Last 3 Encounters:   22 36.78 kg/m²   22 36.08 kg/m²   22 36.44 kg/m²   ]      Vitals: BP (!) 183/103 (Site: Left Upper Arm, Position: Sitting, Cuff Size: Large Adult)   Pulse 66   Temp 98.6 °F (37 °C) (Temporal)   Resp 18   Ht 5' 5\" (1.651 m)   Wt 221 lb (100.2 kg)   SpO2 99%   BMI 36.78 kg/m²     Physical Exam  Constitutional:       General: She is not in acute distress. Appearance: Normal appearance. HENT:      Head: Normocephalic and atraumatic. Mouth/Throat:      Comments: No oral swelling  Cardiovascular:      Rate and Rhythm: Normal rate and regular rhythm. Heart sounds: No murmur heard. Pulmonary:      Effort: Pulmonary effort is normal.      Breath sounds: Normal breath sounds. No wheezing or rales. Abdominal:      General: Abdomen is flat. Palpations: Abdomen is soft. Tenderness: There is no abdominal tenderness. There is no right CVA tenderness, left CVA tenderness or guarding. Musculoskeletal:      Cervical back: Normal range of motion and neck supple. Right lower leg: No edema. Left lower leg: No edema. Neurological:      Mental Status: She is alert.         ______________________________________________________________________    Assessment & Plan :     Diagnosis Orders   1. Palpitations  EKG 12 Lead    Basic Metabolic Panel    Holter Monitor 48 Hour    CATECHOLAMINES, FRACTIONATED, PLASMA    METANEPHRINES URINE   2. Primary hypertension  Blood Pressure KIT    amLODIPine (NORVASC) 10 MG tablet     Palpitations: Unclear differential including uncontrolled high blood pressure versus cardiac arrhythmia versus panic versus pheochromocytoma. Labs as ordered. We will arrange 48-hour monitor, consider 30-day monitor pending results of 48-hour monitor. EKG reviewed, normal sinus rhythm without abnormality. Hypertension: Blood pressure markedly elevated, did not take lisinopril today, advised to continue lisinopril, add amlodipine 10 mg, check ambulatory readings and whenever symptoms arise.       Follow up:  Return in about 2 weeks (around 5/10/2022) for follow up with PCP, follow up new medication, review test results.       Debo Arriaga MD PGY-3    Discussed with: Dr. Jonny Stone

## 2022-04-26 NOTE — PROGRESS NOTES
S: 42 y.o. female with a PMH of HTN, mild intermittent asthma, who presents with intermittent palpitations. This has occurred seldomly over the past few years. However, in the past 24 hours the symptoms are more frequent and associated with lightheadedness, sweating, flushing, and palpitations. No headaches. She is on lisinopril 20 mg daily for BP. No oral swelling, speech or swallowing difficulty. Unknown family med hx as the patient was adopted. Patient did not take her medication this morning. No symptoms today in the office. TSH was normal in January 2022. O: VS: BP (!) 183/103 (Site: Left Upper Arm, Position: Sitting, Cuff Size: Large Adult)   Pulse 66   Temp 98.6 °F (37 °C) (Temporal)   Resp 18   Ht 5' 5\" (1.651 m)   Wt 221 lb (100.2 kg)   SpO2 99%   BMI 36.78 kg/m²    General: NAD, appropriate affect and grooming   CV:  RRR, no gallops, rubs, or murmurs   Resp: CTAB   Abd:  Soft, nontender   Ext:  No edema    Impression/Plan:  1. Heart palpitations  2. Essential hypertension    48 holter monitor  Add amlodipine 10 mg daily  BP monitor order  Check BMP in the office today  Urine 24 hour fractionated metanephrines  Recommend reduction of stimulants-nicotine, caffeine    Follow up 2 weeks to discuss all results    Attending Physician Statement  I have discussed the case, including pertinent history and exam findings with the resident. I agree with the documented assessment and plan.

## 2022-05-04 ENCOUNTER — HOSPITAL ENCOUNTER (OUTPATIENT)
Age: 42
Discharge: HOME | End: 2022-05-04
Payer: MEDICARE

## 2022-05-04 DIAGNOSIS — R00.2 PALPITATIONS: ICD-10-CM

## 2022-05-04 LAB
ANION GAP SERPL CALCULATED.3IONS-SCNC: 11 MMOL/L (ref 7–16)
BUN BLDV-MCNC: 16 MG/DL (ref 6–20)
CALCIUM SERPL-MCNC: 9.5 MG/DL (ref 8.6–10.2)
CHLORIDE BLD-SCNC: 103 MMOL/L (ref 98–107)
CO2: 24 MMOL/L (ref 22–29)
CREAT SERPL-MCNC: 0.8 MG/DL (ref 0.5–1)
GFR AFRICAN AMERICAN: >60
GFR NON-AFRICAN AMERICAN: >60 ML/MIN/1.73
GLUCOSE BLD-MCNC: 95 MG/DL (ref 74–99)
POTASSIUM SERPL-SCNC: 3.9 MMOL/L (ref 3.5–5)
SODIUM BLD-SCNC: 138 MMOL/L (ref 132–146)

## 2022-05-04 PROCEDURE — 36415 COLL VENOUS BLD VENIPUNCTURE: CPT

## 2022-05-04 PROCEDURE — 82384 ASSAY THREE CATECHOLAMINES: CPT

## 2022-05-04 PROCEDURE — 80048 BASIC METABOLIC PNL TOTAL CA: CPT

## 2022-05-13 LAB
CATECHOLAMINE INTERPRETATION, PLASMA: ABNORMAL
DOPAMINE PLASMA: 26 PG/ML (ref 0–20)
EPINEPHRINE PLASMA: 20 PG/ML (ref 10–200)
NOREPINEPHRINE: 596 PG/ML (ref 80–520)

## 2022-05-23 DIAGNOSIS — I10 PRIMARY HYPERTENSION: ICD-10-CM

## 2022-05-23 NOTE — TELEPHONE ENCOUNTER
Last Appointment:  2/21/2022  Future Appointments   Date Time Provider Barney Mathis   6/7/2022  2:30 PM MD Jenn Rubio [facility]

## 2022-06-07 ENCOUNTER — OFFICE VISIT (OUTPATIENT)
Dept: FAMILY MEDICINE CLINIC | Age: 42
End: 2022-06-07
Payer: MEDICARE

## 2022-06-07 VITALS
TEMPERATURE: 98.7 F | DIASTOLIC BLOOD PRESSURE: 105 MMHG | WEIGHT: 212 LBS | RESPIRATION RATE: 18 BRPM | HEART RATE: 76 BPM | OXYGEN SATURATION: 97 % | BODY MASS INDEX: 35.32 KG/M2 | SYSTOLIC BLOOD PRESSURE: 166 MMHG | HEIGHT: 65 IN

## 2022-06-07 DIAGNOSIS — G47.33 OSA (OBSTRUCTIVE SLEEP APNEA): ICD-10-CM

## 2022-06-07 DIAGNOSIS — T78.2XXD ANAPHYLAXIS, SUBSEQUENT ENCOUNTER: ICD-10-CM

## 2022-06-07 DIAGNOSIS — R00.2 PALPITATIONS: ICD-10-CM

## 2022-06-07 DIAGNOSIS — G47.33 OSA (OBSTRUCTIVE SLEEP APNEA): Primary | ICD-10-CM

## 2022-06-07 DIAGNOSIS — I10 HYPERTENSION, UNSPECIFIED TYPE: ICD-10-CM

## 2022-06-07 LAB
ANION GAP SERPL CALCULATED.3IONS-SCNC: 10 MMOL/L (ref 7–16)
BUN BLDV-MCNC: 16 MG/DL (ref 6–20)
CALCIUM SERPL-MCNC: 9.2 MG/DL (ref 8.6–10.2)
CHLORIDE BLD-SCNC: 102 MMOL/L (ref 98–107)
CO2: 23 MMOL/L (ref 22–29)
CREAT SERPL-MCNC: 0.8 MG/DL (ref 0.5–1)
GFR AFRICAN AMERICAN: >60
GFR NON-AFRICAN AMERICAN: >60 ML/MIN/1.73
GLUCOSE BLD-MCNC: 76 MG/DL (ref 74–99)
POTASSIUM SERPL-SCNC: 4.3 MMOL/L (ref 3.5–5)
SODIUM BLD-SCNC: 135 MMOL/L (ref 132–146)

## 2022-06-07 PROCEDURE — G8417 CALC BMI ABV UP PARAM F/U: HCPCS

## 2022-06-07 PROCEDURE — 4004F PT TOBACCO SCREEN RCVD TLK: CPT

## 2022-06-07 PROCEDURE — G8427 DOCREV CUR MEDS BY ELIG CLIN: HCPCS

## 2022-06-07 PROCEDURE — 99213 OFFICE O/P EST LOW 20 MIN: CPT

## 2022-06-07 PROCEDURE — 99212 OFFICE O/P EST SF 10 MIN: CPT

## 2022-06-07 RX ORDER — EPINEPHRINE 0.3 MG/.3ML
0.3 INJECTION SUBCUTANEOUS
Qty: 0.3 ML | Refills: 0 | Status: SHIPPED | OUTPATIENT
Start: 2022-06-07 | End: 2022-06-07

## 2022-06-07 RX ORDER — LISINOPRIL AND HYDROCHLOROTHIAZIDE 20; 12.5 MG/1; MG/1
1 TABLET ORAL DAILY
Qty: 30 TABLET | Refills: 0 | Status: SHIPPED
Start: 2022-06-07 | End: 2022-07-26

## 2022-06-07 NOTE — PROGRESS NOTES
Attending Physician Statement    S:   Chief Complaint   Patient presents with    Other     f/u    Hypertension     high bp       41/F who presents to the clinic today for follow-up of hypertension and palpitations. Previously ordered Holter monitor not completed, scheduled for later this month. Continues to endorse intermittent palpitations about 2 times weekly that lasts less than a minute and resolve spontaneously. Symptoms are not associated with exertion. Patient endorses snoring with daytime fatigue. O: Blood pressure (!) 166/105, pulse 76, temperature 98.7 °F (37.1 °C), temperature source Temporal, resp. rate 18, height 5' 5\" (1.651 m), weight 212 lb (96.2 kg), last menstrual period 06/05/2022, SpO2 97 %. Exam:   Heart - RRR   Lungs - clear  A: Uncontrolled hypertension, concern for TANK  P:  Sleep study   Discontinue Norvasc, initiate hydrochlorothiazide   Continue lisinopril   Urine metanephrines to complete secondary hypertension work-up   Follow-up as ordered    I have discussed the case, including pertinent history and exam findings with the resident. I agree with the documented assessment and plan.

## 2022-06-07 NOTE — PROGRESS NOTES
[address] [place] [address]  FAMILY MEDICINE RESIDENCY PROGRAM  DATE OF VISIT : 2022    Patient : Rachel Wood   Age : 42 y.o.  : 1980   MRN : [id number]   ______________________________________________________________________    Chief Complaint:   Chief Complaint   Patient presents with    Other     f/u    Hypertension     high bp        HPI:   History obtained from the patient. Rachel Wood is a 42 y.o. female that presents today for follow up on hypertension and intermittent palpitations. Palpitations much better improved since last visit and with being compliant with her lisinopril. Has not been taking her Norvasc at night because she forgets. Palpitations happen 1-2x per week and present with chest discomfort that lasts seconds. They are not related to exertion and presents randomly even when she is watching TV/ resting. States this chest discomfort is completely relieved by doing stretches of Upper extremities. Resolves on its own after 5-10 seconds. Also accompanied by facial flushing, sweating. Patient will have Holter monitor placed on the  of this month as instructed on previous visits. TANK  Has never been worked up for TANK. States she presents  fatigue during the day,  Snores loudly , Suffers Apneic episodes per family members , and suffers concentration. STOP BANG 5    Bee venom anaphylaxis  Patient requesting epipen as she only has one at the moment. Had anaphylactic reaction to bee venom in [facility] 5 years ago requiring ED visit. Swelling around respiratory tract with shortness of breath. Patient was discharged with Epipen. She has only one at the moment.         Past Medical History:  Past Medical History:   Diagnosis Date    Allergic rhinitis due to animal hair and dander 2022    Hypertension 2022    LDL-c greater than or equal to 100 mg/dl 2022    Mild intermittent asthma without complication        Past Surgical History:  No past surgical history on file. Family History:  Family History   Adopted: Yes       Social History:  Social History     Socioeconomic History    Marital status: Legally      Spouse name: None    Number of children: None    Years of education: None    Highest education level: None   Occupational History    None   Tobacco Use    Smoking status: Current Every Day Smoker     Packs/day: 1.00     Types: Cigarettes     Start date: 1/1/1994    Smokeless tobacco: Never Used   Substance and Sexual Activity    Alcohol use: Yes     Comment: socially    Drug use: Yes     Types: Marijuana Garon Kettle)    Sexual activity: None   Other Topics Concern    None   Social History Narrative    None     Social Determinants of Health     Financial Resource Strain: Low Risk     Difficulty of Paying Living Expenses: Not hard at all   Food Insecurity: No Food Insecurity    Worried About Running Out of Food in the Last Year: Never true    Dylon of Food in the Last Year: Never true   Transportation Needs:     Lack of Transportation (Medical): Not on file    Lack of Transportation (Non-Medical):  Not on file   Physical Activity:     Days of Exercise per Week: Not on file    Minutes of Exercise per Session: Not on file   Stress:     Feeling of Stress : Not on file   Social Connections:     Frequency of Communication with Friends and Family: Not on file    Frequency of Social Gatherings with Friends and Family: Not on file    Attends Zoroastrian Services: Not on file    Active Member of Clubs or Organizations: Not on file    Attends Club or Organization Meetings: Not on file    Marital Status: Not on file   Intimate Partner Violence:     Fear of Current or Ex-Partner: Not on file    Emotionally Abused: Not on file    Physically Abused: Not on file    Sexually Abused: Not on file   Housing Stability:     Unable to Pay for Housing in the Last Year: Not on file    Number of [place] in the Last Year: Not on file  Unstable Housing in the Last Year: Not on file       Allergies: Allergies   Allergen Reactions    Bee Venom Swelling       Medications:    Current Outpatient Medications   Medication Sig Dispense Refill    lisinopril-hydroCHLOROthiazide (PRINZIDE;ZESTORETIC) 20-12.5 MG per tablet Take 1 tablet by mouth daily 30 tablet 0    EPINEPHrine (EPIPEN 2-PORTIA) 0.3 MG/0.3ML SOAJ injection Inject 0.3 mLs into the muscle once as needed (anaphylaxis) Use as directed for allergic reaction 0.3 mL 0    cetirizine (ZYRTEC) 10 MG tablet Take 1 tablet by mouth daily 90 tablet 1    albuterol sulfate HFA (VENTOLIN HFA) 108 (90 Base) MCG/ACT inhaler Inhale 2 puffs into the lungs 4 times daily as needed for Wheezing 18 g 0    Blood Pressure KIT 1 Units by Does not apply route in the morning and at bedtime 1 kit 0    EPINEPHrine (EPIPEN) 0.3 MG/0.3ML SOAJ injection Use as directed for allergic reaction (Patient not taking: Reported on 6/7/2022) 1 each 2     No current facility-administered medications for this visit. Review of Systems:  Review of Systems   Constitutional: Positive for fatigue. Negative for chills, diaphoresis and fever. HENT: Negative for facial swelling, hearing loss and sinus pain. Eyes: Negative for discharge and itching. Respiratory: Negative for cough, choking, shortness of breath and wheezing. Cardiovascular: Positive for palpitations and leg swelling. Gastrointestinal: Negative for constipation, diarrhea and nausea. Endocrine: Negative for polydipsia. Genitourinary: Negative for decreased urine volume, urgency and vaginal bleeding. Musculoskeletal: Negative for myalgias and neck pain. Skin: Negative for rash and wound. Allergic/Immunologic: Negative for immunocompromised state. Neurological: Negative for tremors, speech difficulty and weakness. Psychiatric/Behavioral: Negative for hallucinations. The patient is not nervous/anxious and is not hyperactive. ______________________________________________________________________    Physical Exam:    Vitals: BP (!) 166/105 (Site: Right Upper Arm, Position: Sitting, Cuff Size: Medium Adult)   Pulse 76   Temp 98.7 °F (37.1 °C) (Temporal)   Resp 18   Ht 5' 5\" (1.651 m)   Wt 212 lb (96.2 kg)   LMP 06/05/2022   SpO2 97%   BMI 35.28 kg/m²   Physical Exam  Constitutional:       General: She is not in acute distress. Appearance: She is obese. She is not ill-appearing or toxic-appearing. HENT:      Head: Normocephalic and atraumatic. Right Ear: Tympanic membrane and external ear normal.      Left Ear: Tympanic membrane and external ear normal.      Nose: Nose normal. No congestion or rhinorrhea. Mouth/Throat:      Mouth: Mucous membranes are moist.      Pharynx: Oropharynx is clear. No oropharyngeal exudate or posterior oropharyngeal erythema. Eyes:      General:         Right eye: No discharge. Left eye: No discharge. Conjunctiva/sclera: Conjunctivae normal.      Pupils: Pupils are equal, round, and reactive to light. Cardiovascular:      Rate and Rhythm: Normal rate and regular rhythm. Heart sounds: Normal heart sounds. No murmur heard. No friction rub. No gallop. Pulmonary:      Effort: Pulmonary effort is normal.      Breath sounds: Normal breath sounds. No wheezing, rhonchi or rales. Abdominal:      General: Bowel sounds are normal. There is no distension. Palpations: Abdomen is soft. There is no mass. Tenderness: There is no abdominal tenderness. Musculoskeletal:         General: No swelling or tenderness. Normal range of motion. Cervical back: Normal range of motion and neck supple. Right lower leg: No edema. Left lower leg: No edema. Skin:     General: Skin is warm. Capillary Refill: Capillary refill takes less than 2 seconds. Findings: No bruising, lesion or rash. Neurological:      General: No focal deficit present. Psychiatric:         Mood and Affect: Mood normal.         Behavior: Behavior normal.       ______________________________________________________________________    Assessment & Plan:  1. TANK (obstructive sleep apnea)  Will work up TANK due to symptoms as per HPI. This can also be causing her hypertension. Follow up after sleep study.  - Baseline Diagnostic Sleep Study; Future  - Basic Metabolic Panel; Future    2. Hypertension, unspecified type  Not controlled on current lisinopril 20mg. Has not been compliant with Norvasc. Will discontinue amlodipine and start combination of Lisinopril-Hctz. Will also workup additional causes for hypertension such as TANK and Pheochromocytoma. At this time, hard to tell if her BP is improving on medications or is being resistant to medications as she has not been compliant. Holter is already ordered and will be placed on the 17th on this month. - lisinopril-hydroCHLOROthiazide (PRINZIDE;ZESTORETIC) 20-12.5 MG per tablet; Take 1 tablet by mouth daily  Dispense: 30 tablet; Refill: 0  - Baseline Diagnostic Sleep Study; Future  - Basic Metabolic Panel; Future  - Urine metanephrine. 3. Anaphylaxis, subsequent encounter  Bee/wasp venom anaphalyxasis. 5-6 years ago, allergic reaction requiring ED visit with swelling and difficulty breathing. Was given epipen. Only has one at this time. 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 Return to Office: No follow-ups on file.     Charles Bond MD   This case was discussed with Dr. Trev Murillo

## 2022-06-08 ASSESSMENT — ENCOUNTER SYMPTOMS
FACIAL SWELLING: 0
SINUS PAIN: 0
SHORTNESS OF BREATH: 0
WHEEZING: 0
NAUSEA: 0
CHOKING: 0
EYE ITCHING: 0
COUGH: 0
EYE DISCHARGE: 0
DIARRHEA: 0
CONSTIPATION: 0

## 2022-06-10 ENCOUNTER — TELEPHONE (OUTPATIENT)
Dept: FAMILY MEDICINE CLINIC | Age: 42
End: 2022-06-10

## 2022-06-10 RX ORDER — LISINOPRIL 20 MG/1
TABLET ORAL
Qty: 30 TABLET | Refills: 2 | Status: SHIPPED | OUTPATIENT
Start: 2022-06-10

## 2022-06-10 NOTE — TELEPHONE ENCOUNTER
CALLED IN LISINOPRIL 20MG WITH 2 REFILLS TO TAKE ONE TABLET DAILY TO [facility] AS THE E-PRESCRIPTION DID NOT TRANSMIT.

## 2022-06-11 LAB
CREATININE 24 HOUR URINE: NORMAL MG/D (ref 700–1600)
CREATININE URINE: 92 MG/DL
HOURS COLLECTED: NORMAL
METANEPHRINE INTREP URINE: NORMAL
METANEPHRINE UG/G CRE: 92 UG/G CRT (ref 0–300)
METANEPHRINE, UR-PER VOL: 85 UG/L
METANEPHRINES URINE: NORMAL UG/D (ref 36–229)
NORMETANEPHRINE 24 HOUR URINE: NORMAL UG/D (ref 95–650)
NORMETANEPHRINE, (G/CRT): 208 UG/G CRT (ref 0–400)
NORMETANEPHRINES, NMOL/L: 191 UG/L
URINE TOTAL VOLUME: NORMAL

## 2022-06-17 ENCOUNTER — HOSPITAL ENCOUNTER (OUTPATIENT)
Dept: SLEEP CENTER | Age: 42
Discharge: HOME | End: 2022-06-17
Payer: MEDICARE

## 2022-06-17 DIAGNOSIS — R00.2 PALPITATIONS: ICD-10-CM

## 2022-06-17 DIAGNOSIS — R00.0 TACHYCARDIA: Primary | ICD-10-CM

## 2022-06-17 PROCEDURE — 93225 XTRNL ECG REC<48 HRS REC: CPT

## 2022-06-17 PROCEDURE — 93226 XTRNL ECG REC<48 HR SCAN A/R: CPT

## 2022-07-12 DIAGNOSIS — I10 HYPERTENSION, UNSPECIFIED TYPE: ICD-10-CM

## 2022-07-12 RX ORDER — LISINOPRIL AND HYDROCHLOROTHIAZIDE 20; 12.5 MG/1; MG/1
TABLET ORAL
Qty: 30 TABLET | Refills: 0 | OUTPATIENT
Start: 2022-07-12

## 2022-07-25 DIAGNOSIS — I10 HYPERTENSION, UNSPECIFIED TYPE: ICD-10-CM

## 2022-07-26 RX ORDER — LISINOPRIL AND HYDROCHLOROTHIAZIDE 20; 12.5 MG/1; MG/1
TABLET ORAL
Qty: 30 TABLET | Refills: 0 | Status: SHIPPED
Start: 2022-07-26 | End: 2022-08-29

## 2022-07-26 NOTE — TELEPHONE ENCOUNTER
Last Appointment:  6/7/2022  Future Appointments   Date Time Provider Barney Mathis   7/27/2022  9:00 PM [facility] BEDROOM 4 CRU[facility] SLEEP Mariola HOD

## 2022-08-27 DIAGNOSIS — I10 HYPERTENSION, UNSPECIFIED TYPE: ICD-10-CM

## 2022-08-29 RX ORDER — LISINOPRIL AND HYDROCHLOROTHIAZIDE 20; 12.5 MG/1; MG/1
TABLET ORAL
Qty: 30 TABLET | Refills: 0 | Status: SHIPPED
Start: 2022-08-29 | End: 2022-10-05

## 2022-09-13 DIAGNOSIS — I10 PRIMARY HYPERTENSION: ICD-10-CM

## 2022-09-13 RX ORDER — AMLODIPINE BESYLATE 10 MG/1
TABLET ORAL
Qty: 30 TABLET | Refills: 3 | OUTPATIENT
Start: 2022-09-13

## 2022-10-05 DIAGNOSIS — I10 HYPERTENSION, UNSPECIFIED TYPE: ICD-10-CM

## 2022-10-05 RX ORDER — LISINOPRIL AND HYDROCHLOROTHIAZIDE 20; 12.5 MG/1; MG/1
TABLET ORAL
Qty: 30 TABLET | Refills: 0 | Status: SHIPPED
Start: 2022-10-05 | End: 2022-10-31 | Stop reason: SDUPTHER

## 2022-10-31 ENCOUNTER — OFFICE VISIT (OUTPATIENT)
Dept: FAMILY MEDICINE CLINIC | Age: 42
End: 2022-10-31
Payer: MEDICARE

## 2022-10-31 VITALS
HEART RATE: 68 BPM | TEMPERATURE: 98.6 F | SYSTOLIC BLOOD PRESSURE: 146 MMHG | BODY MASS INDEX: 34.66 KG/M2 | HEIGHT: 65 IN | WEIGHT: 208 LBS | OXYGEN SATURATION: 98 % | DIASTOLIC BLOOD PRESSURE: 93 MMHG | RESPIRATION RATE: 18 BRPM

## 2022-10-31 DIAGNOSIS — Z12.4 CERVICAL CANCER SCREENING: ICD-10-CM

## 2022-10-31 DIAGNOSIS — Z91.09 ALLERGY TO ENVIRONMENTAL FACTORS: ICD-10-CM

## 2022-10-31 DIAGNOSIS — J45.20 MILD INTERMITTENT ASTHMA WITHOUT COMPLICATION: ICD-10-CM

## 2022-10-31 DIAGNOSIS — I10 PRIMARY HYPERTENSION: Primary | ICD-10-CM

## 2022-10-31 DIAGNOSIS — N89.8 VAGINAL DISCHARGE: ICD-10-CM

## 2022-10-31 LAB
BACTERIA: ABNORMAL /HPF
BILIRUBIN URINE: NEGATIVE
BLOOD, URINE: ABNORMAL
CLARITY: ABNORMAL
COLOR: YELLOW
EPITHELIAL CELLS, UA: ABNORMAL /HPF
GLUCOSE URINE: NEGATIVE MG/DL
KETONES, URINE: NEGATIVE MG/DL
LEUKOCYTE ESTERASE, URINE: ABNORMAL
NITRITE, URINE: NEGATIVE
PH UA: 6 (ref 5–9)
PROTEIN UA: NEGATIVE MG/DL
RBC UA: ABNORMAL /HPF (ref 0–2)
SPECIFIC GRAVITY UA: 1.02 (ref 1–1.03)
UROBILINOGEN, URINE: 0.2 E.U./DL
WBC UA: ABNORMAL /HPF (ref 0–5)

## 2022-10-31 PROCEDURE — G8484 FLU IMMUNIZE NO ADMIN: HCPCS

## 2022-10-31 PROCEDURE — 3080F DIAST BP >= 90 MM HG: CPT

## 2022-10-31 PROCEDURE — 4004F PT TOBACCO SCREEN RCVD TLK: CPT

## 2022-10-31 PROCEDURE — G8427 DOCREV CUR MEDS BY ELIG CLIN: HCPCS

## 2022-10-31 PROCEDURE — 99213 OFFICE O/P EST LOW 20 MIN: CPT

## 2022-10-31 PROCEDURE — 3077F SYST BP >= 140 MM HG: CPT

## 2022-10-31 PROCEDURE — 99214 OFFICE O/P EST MOD 30 MIN: CPT

## 2022-10-31 PROCEDURE — 81003 URINALYSIS AUTO W/O SCOPE: CPT

## 2022-10-31 PROCEDURE — G8417 CALC BMI ABV UP PARAM F/U: HCPCS

## 2022-10-31 RX ORDER — LISINOPRIL AND HYDROCHLOROTHIAZIDE 20; 12.5 MG/1; MG/1
TABLET ORAL
Qty: 30 TABLET | Refills: 0 | Status: SHIPPED | OUTPATIENT
Start: 2022-10-31

## 2022-10-31 RX ORDER — FLUTICASONE PROPIONATE 50 MCG
2 SPRAY, SUSPENSION (ML) NASAL DAILY
Qty: 48 G | Refills: 1 | Status: SHIPPED | OUTPATIENT
Start: 2022-10-31

## 2022-10-31 RX ORDER — CETIRIZINE HYDROCHLORIDE 10 MG/1
10 TABLET ORAL DAILY
Qty: 90 TABLET | Refills: 1 | Status: SHIPPED | OUTPATIENT
Start: 2022-10-31

## 2022-10-31 RX ORDER — ALBUTEROL SULFATE 90 UG/1
2 AEROSOL, METERED RESPIRATORY (INHALATION) 4 TIMES DAILY PRN
Qty: 18 G | Refills: 0 | Status: SHIPPED | OUTPATIENT
Start: 2022-10-31

## 2022-10-31 NOTE — PROGRESS NOTES
[facility]  FAMILY MEDICINE RESIDENCY PROGRAM  DATE OF VISIT : 2022    Patient : Mecca Gore   Age : 36 y.o.  : 1980   MRN : [id number]   ________________________________________________________________    Chief Complaint:   Chief Complaint   Patient presents with    Sinus Problem    Vaginal Discharge     X 1 month       HPI:   History obtained from the patient. Mecca Gore is a 36 y.o. female here for office visit    Complaining of abnormal vaginal discharge. Discharge has been present for the last month or so, presents as whitish, clear, odorless discharge. Discharge has been worsening steadily and now has to wear pads during the day. Denies any new sexual partners on last months. Denies any burning, itching, pain on vaginal area. Denies any unusual intermenstrual bleeding. Denies using douches. Denies changing or using new soaps/detergents. Has not taken any medicine or supplements for symptoms. Also follow up on HTN -  Takes med as described. Rare missed doses. No CP/SOB/HELMS/Leg Cramping w/ Exertion/LE Edema. No side effects. Allergy to enviromental factors-presents with runny nose and sometimes nasal congestion. Denies headache watery eyes , eye irritation blurry vision, epistaxis, wheezing shortness of breath or chest pain. 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 Has been presenting symptoms since she moved to the area and started working this job around 6 months ago. Has tried over-the-counter Zyrtec and Allegra with no relief. Asthma-only using her albuterol inhaler around 1 time per week. Has not had any other inhalers in the past.  No episodes of waking up during the night with shortness of breath , wheezing or  cough    HM-last Pap smear more than 5 years ago. No records or reports available EMR.   Does not remember where she had her last Pap smear    I reviewed the patient's past medications, allergies, past medical history, past surgical history, family history and social history during this visit      ROS:  Pertinent positives and negatives are stated within HPI    Physical Exam:    Vitals: BP (!) 146/93 (Site: Right Upper Arm, Position: Sitting, Cuff Size: Medium Adult)   Pulse 68   Temp 98.6 °F (37 °C) (Temporal)   Resp 18   Ht 5' 5\" (1.651 m)   Wt 208 lb (94.3 kg)   SpO2 98%   BMI 34.61 kg/m²     Physical Exam  Constitutional:       General: She is not in acute distress. Appearance: Normal appearance. She is not ill-appearing. Cardiovascular:      Rate and Rhythm: Normal rate and regular rhythm. Heart sounds: Normal heart sounds. No murmur heard. Pulmonary:      Effort: Pulmonary effort is normal.      Breath sounds: Normal breath sounds. No wheezing, rhonchi or rales.   VAGINA: vaginal discharge noted, clear, white, odorless, and thin,   CERVIX: normal appearing cervix without discharge or lesions, lesions absent, DNA probe for chlamydia and GC obtained, cervical motion tenderness absent,   UTERUS: uterus is normal size, shape, consistency and nontender,   Musculoskeletal:         General: No swelling. Right lower leg: No edema. Left lower leg: No edema. .     Assessment & Plan:  1. Primary hypertension  Continue Taking lisinopril/ HCTZ 20-12.5 mgs daily. - lisinopril-hydroCHLOROthiazide (PRINZIDE;ZESTORETIC) 20-12.5 MG per tablet; TAKE 1 TABLET BY MOUTH EVERY DAY  Dispense: 30 tablet; Refill: 0    2. Mild intermittent asthma without complication  Controlled. Stable. Continue using albuterol as needed. Consider adding inhaled corticosteroid on next visit    3. Vaginal discharge  Broad differential. Will swab for multiple likely pathologies of infectious origin as below. Will also obtain urine analysis and urine culture and follow-up with results. Will hold on starting antibiotics until results of pending laboratory/tests.    - C.trachomatis N.gonorrhoeae DNA; Future  - TRICHOMONAS SCREEN; Future  - Urinalysis; Future  - Culture, Urine  - Urinalysis      4. Cervical cancer screening  - PAP SMEAR obtained this visit. 5. Allergy to environmental factors  Counseled patient on using proper protective equipment while working on current job. Will also start Flonase 2 puffs daily and reevaluate on further visits. Patient will call me if no improvement in symptoms. -flonase 50mcg 2 puffs BID. - cetirizine (ZYRTEC) 10 MG tablet; Take 1 tablet by mouth daily  Dispense: 90 tablet; Refill: 1        Educational materials printed for patient's review and were included in patient instructions on his/her After Visit Summary and given to patient at the end of visit. Counseled regarding above diagnosis, including possible risks and complications,  especially if left uncontrolled. Counseled regarding the possible side effects, risks, benefits and alternatives to treatment. Call or go to ED immediately if symptoms worsen or persist. Indications and proper use of medication(s) reviewed. Potential side-effects and risks of medication(s) also explained. Reviewed age and gender appropriate health screening exams and vaccinations. Advised patient regarding importance of keeping up with recommended health maintenance and to schedule as soon as possible if overdue, as this is important in assessing for undiagnosed pathology, especially cancer, as well as protecting against potentially harmful/life threatening disease. Patient and/or guardian verbalizes understanding and agrees with above counseling, assessment and plan. All questions answered. Return to Office: Return in about 3 months (around 1/31/2023). Jennifer Boykin MD   This case was discussed with Dr. Pam Banks.

## 2022-10-31 NOTE — ASSESSMENT & PLAN NOTE
Taking lisinopril/ HCTZ 20-12.5 mgs as described. Rare missed doses. No CP/SOB/HELMS/Leg Cramping w/ Exertion/LE Edema. No side effects.

## 2022-11-01 ENCOUNTER — TELEPHONE (OUTPATIENT)
Dept: FAMILY MEDICINE CLINIC | Age: 42
End: 2022-11-01

## 2022-11-01 NOTE — TELEPHONE ENCOUNTER
Pt states that she have pressure on the right side of her face and nose still running, having trouble breathing. Pt was calling to make sure that the Flonase was sent due her going to the pharmacy yesterday and they did not have it. I assure the Pt the Rx was sent yesterday, and Pt said she would try the Flonase to see if it works. I also told pt if her symptoms get worst to go to the emergency room. Pt verbalized understanding.

## 2022-11-02 LAB
SOURCE: NORMAL
URINE CULTURE, ROUTINE: NORMAL

## 2022-11-02 NOTE — PROGRESS NOTES
S: 36 y.o. female presents today for: vaginal discharge    1mo hx of vaginal discharge. Has a chronic history but this is an increase from baseline. Clear to white, odorless. Recently finished menses. O: VS: BP (!) 146/93 (Site: Right Upper Arm, Position: Sitting, Cuff Size: Medium Adult)   Pulse 68   Temp 98.6 °F (37 °C) (Temporal)   Resp 18   Ht 5' 5\" (1.651 m)   Wt 208 lb (94.3 kg)   SpO2 98%   BMI 34.61 kg/m²   AAO/NAD, appropriate affect for mood  CV:  RRR, no murmur  Resp: CTAB  : Normal external appearance. No lesions. Thin white discharge otherwise normal appearance. Bimanual per Dr Benard Hamman was benign. Impression/Plan:   1) HTN - stable. CCM  2) Asthma - stable CCM. 3) Vaginal Discharge - obtain infectious studies. Pending rx. Attending Physician Statement  I have discussed the case, including pertinent history and exam findings with the resident. I also have seen the patient and performed key portions of the examination. I agree with the documented assessment and plan.       Diane Valentino MD

## 2022-11-03 LAB
C. TRACHOMATIS DNA ,URINE: NEGATIVE
N. GONORRHOEAE DNA, URINE: NEGATIVE
SOURCE: NORMAL

## 2022-11-04 LAB
CHLAMYDIA BY THIN PREP: NEGATIVE
N. GONORRHOEAE DNA, THIN PREP: NEGATIVE
SOURCE: NORMAL

## 2022-11-05 LAB — CULTURE, TRICHOMONAS: NORMAL

## 2022-11-07 LAB
HPV SAMPLE: ABNORMAL
HPV TYPE 16: NOT DETECTED
HPV TYPE 18: NOT DETECTED
HPV, HIGH RISK OTHER: DETECTED
INTERPRETATION: ABNORMAL
SOURCE: ABNORMAL

## 2022-11-11 ENCOUNTER — TELEPHONE (OUTPATIENT)
Dept: FAMILY MEDICINE CLINIC | Age: 42
End: 2022-11-11

## 2022-11-11 RX ORDER — METRONIDAZOLE 500 MG/1
500 TABLET ORAL 2 TIMES DAILY
Qty: 14 TABLET | Refills: 0 | Status: SHIPPED | OUTPATIENT
Start: 2022-11-11 | End: 2022-11-18

## 2022-11-11 NOTE — TELEPHONE ENCOUNTER
Last Appointment:  10/31/2022  Future Appointments   Date Time Provider Barney Mathis   12/5/2022 10:40 AM MD Chandler King [facility]

## 2022-12-05 DIAGNOSIS — I10 PRIMARY HYPERTENSION: ICD-10-CM

## 2022-12-05 RX ORDER — LISINOPRIL AND HYDROCHLOROTHIAZIDE 20; 12.5 MG/1; MG/1
TABLET ORAL
Qty: 30 TABLET | Refills: 0 | Status: SHIPPED | OUTPATIENT
Start: 2022-12-05

## 2023-02-12 DIAGNOSIS — I10 PRIMARY HYPERTENSION: ICD-10-CM

## 2023-02-14 RX ORDER — LISINOPRIL AND HYDROCHLOROTHIAZIDE 20; 12.5 MG/1; MG/1
TABLET ORAL
Qty: 30 TABLET | Refills: 0 | Status: SHIPPED | OUTPATIENT
Start: 2023-02-14

## 2023-03-21 DIAGNOSIS — I10 PRIMARY HYPERTENSION: ICD-10-CM

## 2023-03-21 RX ORDER — LISINOPRIL AND HYDROCHLOROTHIAZIDE 20; 12.5 MG/1; MG/1
TABLET ORAL
Qty: 30 TABLET | Refills: 0 | Status: SHIPPED | OUTPATIENT
Start: 2023-03-21

## 2023-06-16 ENCOUNTER — TELEPHONE (OUTPATIENT)
Dept: FAMILY MEDICINE CLINIC | Age: 43
End: 2023-06-16

## 2023-06-16 DIAGNOSIS — I10 PRIMARY HYPERTENSION: ICD-10-CM

## 2023-06-16 RX ORDER — LISINOPRIL AND HYDROCHLOROTHIAZIDE 20; 12.5 MG/1; MG/1
TABLET ORAL
Qty: 30 TABLET | Refills: 0 | OUTPATIENT
Start: 2023-06-16

## 2023-07-11 ENCOUNTER — OFFICE VISIT (OUTPATIENT)
Dept: FAMILY MEDICINE CLINIC | Age: 43
End: 2023-07-11
Payer: MEDICAID

## 2023-07-11 VITALS
BODY MASS INDEX: 33.95 KG/M2 | DIASTOLIC BLOOD PRESSURE: 99 MMHG | HEART RATE: 67 BPM | OXYGEN SATURATION: 97 % | TEMPERATURE: 98 F | SYSTOLIC BLOOD PRESSURE: 130 MMHG | WEIGHT: 204 LBS

## 2023-07-11 DIAGNOSIS — I10 PRIMARY HYPERTENSION: ICD-10-CM

## 2023-07-11 DIAGNOSIS — Z71.89 COUNSELING REGARDING ADVANCE CARE PLANNING AND GOALS OF CARE: ICD-10-CM

## 2023-07-11 DIAGNOSIS — Z23 NEED FOR PROPHYLACTIC VACCINATION AGAINST STREPTOCOCCUS PNEUMONIAE (PNEUMOCOCCUS): ICD-10-CM

## 2023-07-11 DIAGNOSIS — Z71.6 ENCOUNTER FOR TOBACCO USE CESSATION COUNSELING: ICD-10-CM

## 2023-07-11 DIAGNOSIS — Z00.00 ENCOUNTER FOR WELL ADULT EXAM WITHOUT ABNORMAL FINDINGS: Primary | ICD-10-CM

## 2023-07-11 DIAGNOSIS — Z20.2 POSSIBLE EXPOSURE TO STD: ICD-10-CM

## 2023-07-11 LAB
BACTERIA URNS QL MICRO: ABNORMAL /HPF
BILIRUB UR QL STRIP: NEGATIVE
CLARITY UR: CLEAR
COLOR UR: YELLOW
GLUCOSE UR STRIP-MCNC: NEGATIVE MG/DL
HGB UR QL STRIP: ABNORMAL
KETONES UR STRIP-MCNC: NEGATIVE MG/DL
LEUKOCYTE ESTERASE UR QL STRIP: NEGATIVE
MUCOUS THREADS URNS QL MICRO: PRESENT /LPF
NITRITE UR QL STRIP: NEGATIVE
PH UR STRIP: 5.5 [PH] (ref 5–9)
PROT UR STRIP-MCNC: ABNORMAL MG/DL
RBC #/AREA URNS HPF: ABNORMAL /HPF (ref 0–2)
SP GR UR STRIP: >=1.03 (ref 1–1.03)
UROBILINOGEN UR STRIP-ACNC: 0.2 E.U./DL
WBC #/AREA URNS HPF: ABNORMAL /HPF (ref 0–5)

## 2023-07-11 PROCEDURE — 81003 URINALYSIS AUTO W/O SCOPE: CPT

## 2023-07-11 PROCEDURE — 3080F DIAST BP >= 90 MM HG: CPT

## 2023-07-11 PROCEDURE — 36415 COLL VENOUS BLD VENIPUNCTURE: CPT | Performed by: FAMILY MEDICINE

## 2023-07-11 PROCEDURE — 90471 IMMUNIZATION ADMIN: CPT | Performed by: FAMILY MEDICINE

## 2023-07-11 PROCEDURE — 3075F SYST BP GE 130 - 139MM HG: CPT

## 2023-07-11 PROCEDURE — 90677 PCV20 VACCINE IM: CPT | Performed by: FAMILY MEDICINE

## 2023-07-11 PROCEDURE — 99213 OFFICE O/P EST LOW 20 MIN: CPT

## 2023-07-11 RX ORDER — LISINOPRIL 20 MG/1
20 TABLET ORAL DAILY
Qty: 30 TABLET | Refills: 2 | Status: CANCELLED | OUTPATIENT
Start: 2023-07-11

## 2023-07-11 RX ORDER — LISINOPRIL AND HYDROCHLOROTHIAZIDE 20; 12.5 MG/1; MG/1
1 TABLET ORAL DAILY
Qty: 60 TABLET | Refills: 1 | Status: SHIPPED | OUTPATIENT
Start: 2023-07-11

## 2023-07-11 SDOH — ECONOMIC STABILITY: INCOME INSECURITY: HOW HARD IS IT FOR YOU TO PAY FOR THE VERY BASICS LIKE FOOD, HOUSING, MEDICAL CARE, AND HEATING?: NOT VERY HARD

## 2023-07-11 SDOH — ECONOMIC STABILITY: FOOD INSECURITY: WITHIN THE PAST 12 MONTHS, YOU WORRIED THAT YOUR FOOD WOULD RUN OUT BEFORE YOU GOT MONEY TO BUY MORE.: SOMETIMES TRUE

## 2023-07-11 SDOH — ECONOMIC STABILITY: HOUSING INSECURITY
IN THE LAST 12 MONTHS, WAS THERE A TIME WHEN YOU DID NOT HAVE A STEADY PLACE TO SLEEP OR SLEPT IN A SHELTER (INCLUDING NOW)?: NO

## 2023-07-11 SDOH — ECONOMIC STABILITY: FOOD INSECURITY: WITHIN THE PAST 12 MONTHS, THE FOOD YOU BOUGHT JUST DIDN'T LAST AND YOU DIDN'T HAVE MONEY TO GET MORE.: SOMETIMES TRUE

## 2023-07-11 ASSESSMENT — PATIENT HEALTH QUESTIONNAIRE - PHQ9
SUM OF ALL RESPONSES TO PHQ QUESTIONS 1-9: 0
SUM OF ALL RESPONSES TO PHQ9 QUESTIONS 1 & 2: 0
2. FEELING DOWN, DEPRESSED OR HOPELESS: NOT AT ALL
SUM OF ALL RESPONSES TO PHQ QUESTIONS 1-9: 0
SUM OF ALL RESPONSES TO PHQ9 QUESTIONS 1 & 2: 0
SUM OF ALL RESPONSES TO PHQ QUESTIONS 1-9: 0
1. LITTLE INTEREST OR PLEASURE IN DOING THINGS: 0
2. FEELING DOWN, DEPRESSED OR HOPELESS: 0
1. LITTLE INTEREST OR PLEASURE IN DOING THINGS: NOT AT ALL
SUM OF ALL RESPONSES TO PHQ QUESTIONS 1-9: 0

## 2023-07-11 NOTE — PATIENT INSTRUCTIONS
Advance Directives: Care Instructions  Overview  An advance directive is a legal way to state your wishes at the end of your life. It tells your family and your doctor what to do if you can't say what you want. There are two main types of advance directives. You can change them any time your wishes change. Living will. This form tells your family and your doctor your wishes about life support and other treatment. The form is also called a declaration. Medical power of . This form lets you name a person to make treatment decisions for you when you can't speak for yourself. This person is called a health care agent (health care proxy, health care surrogate). The form is also called a durable power of  for health care. If you do not have an advance directive, decisions about your medical care may be made by a family member, or by a doctor or a  who doesn't know you. It may help to think of an advance directive as a gift to the people who care for you. If you have one, they won't have to make tough decisions by themselves. For more information, including forms for your state, see the [address] [place] [address] website (www.caringinfo.org/planning/advance-directives/). Follow-up care is a key part of your treatment and safety. Be sure to make and go to all appointments, and call your doctor if you are having problems. It's also a good idea to know your test results and keep a list of the medicines you take. What should you include in an advance directive? Many states have a unique advance directive form. (It may ask you to address specific issues.) Or you might use a universal form that's approved by many states. If your form doesn't tell you what to address, it may be hard to know what to include in your advance directive. Use the questions below to help you get started. Who do you want to make decisions about your medical care if you are not able to?   What life-support measures do you want if you

## 2023-07-11 NOTE — PROGRESS NOTES
Well Adult Note  Name: Gracie Hodgkins Date: 2023   MRN: [id number] Sex: Female   Age: 36 y.o. Ethnicity:  / Fernandez Hale   : 1980 Race:  / Trey Mcgill is here for well adult exam.  History:  Concerns for STD exposure because ex partner was cheating to her. No abnormal discharge, drainage or dysuria. Tobacco cessation  Smokes half a pack per a day for 30 years. Has tried  has tried patch and gum without improvement  Agreeable to smoking cessation. Advance care planning   No living will, wants to remain full code but wants to coordinate with acp. HTN  Did not take medication before presenting to office today, usually well controlled. Rare missed doses. No CP/SOB/HELMS/Leg Cramping w/ Exertion/LE Edema. No side effects. HM  Due for prevnar 20, agreeable for vaccination. Allergies   Allergen Reactions    Bee Venom Swelling    Norvasc [Amlodipine]     Uncaria Tomentosa (Cats Claw)      cats         Prior to Visit Medications    Medication Sig Taking?  Authorizing Provider   lisinopril-hydroCHLOROthiazide (PRINZIDE;ZESTORETIC) 20-12.5 MG per tablet Take 1 tablet by mouth daily  Angie Gil MD   fluticasone [facility]) 50 MCG/ACT nasal spray SPRAY 2 SPRAYS INTO EACH NOSTRIL EVERY DAY  Angie Gil MD   cetirizine (ZYRTEC) 10 MG tablet Take 1 tablet by mouth daily  Angie Gil MD   albuterol sulfate HFA (VENTOLIN HFA) 108 (90 Base) MCG/ACT inhaler Inhale 2 puffs into the lungs 4 times daily as needed for Wheezing  Angie Gil MD   lisinopril (PRINIVIL;ZESTRIL) 20 MG tablet TAKE 1 TABLET BY MOUTH EVERY DAY  Patient not taking: Reported on 10/31/2022  Darwin Gallagher MD   EPINEPHrine (EPIPEN 2-PORTIA) 0.3 MG/0.3ML SOAJ injection Inject 0.3 mLs into the muscle once as needed (anaphylaxis) Use as directed for allergic reaction  Angie Gil MD   Blood Pressure KIT 1 Units by Does not apply route in the morning and at

## 2023-07-12 ENCOUNTER — CLINICAL DOCUMENTATION (OUTPATIENT)
Dept: SPIRITUAL SERVICES | Age: 43
End: 2023-07-12

## 2023-07-12 LAB
HIV1+2 AB SERPL QL IA: NORMAL
RPR SER QL: NORMAL

## 2023-07-12 NOTE — ACP (ADVANCE CARE PLANNING)
Advance Care Planning   Ambulatory ACP Specialist Patient Outreach    Date:  7/12/2023  ACP Specialist:  Jillian Jones    Outreach call to patient in follow-up to ACP Specialist referral from: Ki Aparicio MD    [x] PCP  [] Provider   [] Ambulatory Care Management [] Other for Reason:    [x] Advance Directive Assistance  [] Code Status Discussion  [] Complete Portable DNR Order  [] Discuss Goals of Care  [] Complete POST/MOST  [] Early ACP Decision-Making  [] Other    Date Referral Received:7/11/2023    Today's Outreach:  [x] First   [] Second  [] Third                               Third outreach made by []  phone  [] email []   [employer]     Intervention:  [] Spoke with Patient  [x] Left VM requesting return call      Outcome: I left a voice message requesting a return call and will mail documents. Next Step:   [] ACP scheduled conversation  [] Outreach again in one week               [x] Email / Mail ACP Info Sheets  [x] Email / Mail Advance Directive            [] Close Referral. Routing closure to referring provider/staff and to ACP Specialist . [] Closure Letter mailed to Patient with Invitation to Contact ACP Specialist if/when ready.     Thank you for this referral.

## 2023-07-14 LAB
BACTERIA UR CULT: NORMAL
CHLAMYDIA DNA UR QL NAA+PROBE: NEGATIVE
N GONORRHOEA DNA UR QL NAA+PROBE: NEGATIVE
SPECIMEN SOURCE: NORMAL
SPECIMEN SOURCE: NORMAL

## 2024-01-23 DIAGNOSIS — I10 PRIMARY HYPERTENSION: ICD-10-CM

## 2024-01-23 RX ORDER — LISINOPRIL AND HYDROCHLOROTHIAZIDE 20; 12.5 MG/1; MG/1
1 TABLET ORAL DAILY
Qty: 60 TABLET | Refills: 1 | Status: SHIPPED | OUTPATIENT
Start: 2024-01-23

## 2024-01-28 DIAGNOSIS — Z91.09 ALLERGY TO ENVIRONMENTAL FACTORS: ICD-10-CM

## 2024-01-30 RX ORDER — CETIRIZINE HYDROCHLORIDE 10 MG/1
10 TABLET ORAL DAILY
Qty: 90 TABLET | Refills: 1 | Status: SHIPPED | OUTPATIENT
Start: 2024-01-30

## 2024-05-30 DIAGNOSIS — I10 PRIMARY HYPERTENSION: ICD-10-CM

## 2024-05-31 RX ORDER — LISINOPRIL AND HYDROCHLOROTHIAZIDE 20; 12.5 MG/1; MG/1
1 TABLET ORAL DAILY
Qty: 60 TABLET | Refills: 1 | Status: SHIPPED | OUTPATIENT
Start: 2024-05-31